# Patient Record
Sex: MALE | Race: AMERICAN INDIAN OR ALASKA NATIVE
[De-identification: names, ages, dates, MRNs, and addresses within clinical notes are randomized per-mention and may not be internally consistent; named-entity substitution may affect disease eponyms.]

---

## 2020-03-23 ENCOUNTER — HOSPITAL ENCOUNTER (EMERGENCY)
Dept: HOSPITAL 5 - ED | Age: 20
Discharge: HOME | End: 2020-03-23
Payer: COMMERCIAL

## 2020-03-23 VITALS — DIASTOLIC BLOOD PRESSURE: 64 MMHG | SYSTOLIC BLOOD PRESSURE: 124 MMHG

## 2020-03-23 DIAGNOSIS — J06.9: ICD-10-CM

## 2020-03-23 DIAGNOSIS — J30.9: Primary | ICD-10-CM

## 2020-03-23 PROCEDURE — 71046 X-RAY EXAM CHEST 2 VIEWS: CPT

## 2020-03-23 PROCEDURE — 99283 EMERGENCY DEPT VISIT LOW MDM: CPT

## 2020-03-23 NOTE — EMERGENCY DEPARTMENT REPORT
ED General Adult HPI





- General


Chief complaint: Upper Respiratory Infection


Stated complaint: CHEST PAIN, BOZENA


Time Seen by Provider: 03/23/20 19:30


Source: patient


Mode of arrival: Ambulatory


Limitations: No Limitations





- History of Present Illness


Initial comments: 


Mr. Joya is a 20-year-old -American male with no medical history, who 

presents for cough with chest congestion and shortness of breath x2 days.  

States cough is productive clear, there is sore throat, and body aches.  

Shortness of breath worse at night at hour sleep.  There is been no wheezing, 

fevers, chills, nausea and vomiting.  There is no suspicious travel, no 

suspicious gathering.  Patient denies modifying factors.








- Related Data


                                  Previous Rx's











 Medication  Instructions  Recorded  Last Taken  Type


 


Albuterol INH(or & Nicu Only) 2 puff IH QID PRN #8.5 gram 03/23/20 Unknown Rx





[ProAir HFA Inhaler]    


 


Benzonatate [Tessalon Perles] 100 mg PO Q8HR PRN #30 capsule 03/23/20 Unknown Rx


 


Ibuprofen [Motrin 800 MG tab] 800 mg PO Q8HR PRN #30 tablet 03/23/20 Unknown Rx


 


predniSONE [Deltasone] 40 mg PO QDAY 5 Days #10 tab 03/23/20 Unknown Rx











                                    Allergies











Allergy/AdvReac Type Severity Reaction Status Date / Time


 


Penicillins Allergy  Hives Verified 03/23/20 14:51














ED Review of Systems


ROS: 


Stated complaint: CHEST PAIN, BOZENA


Other details as noted in HPI








ED Past Medical Hx





- Past Medical History


Previous Medical History?: No





- Surgical History


Past Surgical History?: No





- Social History


Smoking Status: Never Smoker


Substance Use Type: None





- Medications


Home Medications: 


                                Home Medications











 Medication  Instructions  Recorded  Confirmed  Last Taken  Type


 


Albuterol INH(or & Nicu Only) 2 puff IH QID PRN #8.5 gram 03/23/20  Unknown Rx





[ProAir HFA Inhaler]     


 


Benzonatate [Tessalon Perles] 100 mg PO Q8HR PRN #30 capsule 03/23/20  Unknown 

Rx


 


Ibuprofen [Motrin 800 MG tab] 800 mg PO Q8HR PRN #30 tablet 03/23/20  Unknown Rx


 


predniSONE [Deltasone] 40 mg PO QDAY 5 Days #10 tab 03/23/20  Unknown Rx














ED Physical Exam





- General


Limitations: No Limitations





ED Course





                                   Vital Signs











  03/23/20





  15:03


 


Temperature 98 F


 


Pulse Rate 97 H


 


Respiratory 18





Rate 


 


Blood Pressure 152/59


 


O2 Sat by Pulse 98





Oximetry 











Critical care attestation.: 


If time is entered above; I have spent that time in minutes in the direct care 

of this critically ill patient, excluding procedure time.








ED Disposition


Clinical Impression: 


URI (upper respiratory infection)


Qualifiers:


 URI type: unspecified viral URI Qualified Code(s): J06.9 - Acute upper 

respiratory infection, unspecified





Allergic rhinitis


Qualifiers:


 Allergic rhinitis trigger: unspecified Allergic rhinitis seasonality: 

unspecified Qualified Code(s): J30.9 - Allergic rhinitis, unspecified





Disposition: DC-01 TO HOME OR SELFCARE


Is pt being admited?: No


Does the pt Need Aspirin: No


Condition: Stable


Instructions:  Upper Respiratory Infection (ED), Allergic Rhinitis (ED)


Prescriptions: 


predniSONE [Deltasone] 40 mg PO QDAY 5 Days #10 tab


Ibuprofen [Motrin 800 MG tab] 800 mg PO Q8HR PRN #30 tablet


 PRN Reason: pain


Albuterol INH(or & Nicu Only) [ProAir HFA Inhaler] 2 puff IH QID PRN #8.5 gram


 PRN Reason: Shortness Of Breath


Benzonatate [Tessalon Perles] 100 mg PO Q8HR PRN #30 capsule


 PRN Reason: Cough


Referrals: 


AMOL PATRICK MD [Staff Physician] - 3-5 Days


Forms:  Work/School Release Form(ED)


Time of Disposition: 20:02

## 2020-03-23 NOTE — EMERGENCY DEPARTMENT REPORT
ED General Adult HPI





- General


Chief complaint: Upper Respiratory Infection


Stated complaint: CHEST PAIN, BOZENA


Time Seen by Provider: 03/23/20 19:30


Source: patient


Mode of arrival: Ambulatory


Limitations: No Limitations





- History of Present Illness


Initial comments: 


Mr. Joya is a 20-year-old -American male with no medical history, who 

presents for cough with chest congestion and shortness of breath x2 days.  

States cough is productive clear, there is sore throat, and body aches.  

Shortness of breath worse at night at hour sleep.  There is been no wheezing, 

fevers, chills, nausea and vomiting.  There is no suspicious travel, no 

suspicious gathering.  Patient denies modifying factors.





Onset/Timing: 3


-: days(s)


Location: head, chest


Radiation: non-radiation


Severity scale (0 -10): 3


Quality: aching


Consistency: constant


Improves with: rest


Worsens with: other (environmental exposure )


Associated Symptoms: cough, shortness of breath.  denies: chest pain, 

diaphoresis, fever/chills, headaches, loss of appetite, nausea/vomiting, 

syncope, weakness


Treatments Prior to Arrival: none





- Related Data


                                  Previous Rx's











 Medication  Instructions  Recorded  Last Taken  Type


 


Albuterol INH(or & Nicu Only) 2 puff IH QID PRN #8.5 gram 03/23/20 Unknown Rx





[ProAir HFA Inhaler]    


 


Benzonatate [Tessalon Perles] 100 mg PO Q8HR PRN #30 capsule 03/23/20 Unknown Rx


 


Ibuprofen [Motrin 800 MG tab] 800 mg PO Q8HR PRN #30 tablet 03/23/20 Unknown Rx


 


predniSONE [Deltasone] 40 mg PO QDAY 5 Days #10 tab 03/23/20 Unknown Rx











                                    Allergies











Allergy/AdvReac Type Severity Reaction Status Date / Time


 


Penicillins Allergy  Hives Verified 03/23/20 14:51














ED Review of Systems


ROS: 


Stated complaint: CHEST PAIN, BOZENA


Other details as noted in HPI





Constitutional: denies: chills, fever, malaise


Eyes: denies: eye pain, eye discharge, vision change


ENT: congestion.  denies: ear pain, throat pain


Respiratory: cough, shortness of breath.  denies: wheezing


Cardiovascular: chest pain (pain with cough ).  denies: palpitations


Endocrine: no symptoms reported


Gastrointestinal: denies: abdominal pain, nausea, vomiting, diarrhea


Genitourinary: denies: urgency, dysuria


Musculoskeletal: denies: back pain, joint swelling, arthralgia


Skin: denies: rash, lesions


Neurological: denies: headache, weakness, paresthesias


Psychiatric: denies: anxiety, depression


Hematological/Lymphatic: denies: easy bleeding, easy bruising





ED Past Medical Hx





- Past Medical History


Previous Medical History?: No





- Surgical History


Past Surgical History?: No





- Social History


Smoking Status: Never Smoker


Substance Use Type: None





- Medications


Home Medications: 


                                Home Medications











 Medication  Instructions  Recorded  Confirmed  Last Taken  Type


 


Albuterol INH(or & Nicu Only) 2 puff IH QID PRN #8.5 gram 03/23/20  Unknown Rx





[ProAir HFA Inhaler]     


 


Benzonatate [Tessalon Perles] 100 mg PO Q8HR PRN #30 capsule 03/23/20  Unknown 

Rx


 


Ibuprofen [Motrin 800 MG tab] 800 mg PO Q8HR PRN #30 tablet 03/23/20  Unknown Rx


 


predniSONE [Deltasone] 40 mg PO QDAY 5 Days #10 tab 03/23/20  Unknown Rx














ED Physical Exam





- General


Limitations: No Limitations


General appearance: alert, in no apparent distress





- Head


Head exam: Present: atraumatic, normocephalic





- Eye


Eye exam: Present: normal appearance, PERRL, EOMI


Pupils: Present: normal accommodation





- ENT


ENT exam: Present: normal orophraynx, mucous membranes moist, TM's normal 

bilaterally, normal external ear exam





- Neck


Neck exam: Present: normal inspection, full ROM.  Absent: tenderness, 

lymphadenopathy, thyromegaly





- Respiratory


Respiratory exam: Present: normal lung sounds bilaterally, chest wall tenderness

 (left lateral chest tenderness to palpation, no crepitus no swelling, no 

deformity. ).  Absent: respiratory distress, wheezes, rales, rhonchi, stridor, 

prolonged expiratory





- Cardiovascular


Cardiovascular Exam: Present: regular rate, normal rhythm, normal heart sounds. 

 Absent: systolic murmur, diastolic murmur, rubs, gallop





- GI/Abdominal


GI/Abdominal exam: Present: soft, normal bowel sounds.  Absent: distended, 

tenderness, bruit, hernia





- Rectal


Rectal exam: Present: deferred





- Extremities Exam


Extremities exam: Present: normal inspection





- Back Exam


Back exam: Present: normal inspection, full ROM.  Absent: tenderness, CVA 

tenderness (R), CVA tenderness (L)





- Neurological Exam


Neurological exam: Present: alert, oriented X3, CN II-XII intact, normal gait





- Psychiatric


Psychiatric exam: Present: normal affect, normal mood





- Skin


Skin exam: Present: warm, dry, intact, normal color.  Absent: rash





ED Course





                                   Vital Signs











  03/23/20 03/23/20





  15:03 20:06


 


Temperature 98 F 


 


Pulse Rate 97 H 


 


Respiratory 18 16





Rate  


 


Blood Pressure 152/59 


 


O2 Sat by Pulse 98 





Oximetry  














ED Medical Decision Making





- Radiology Data


Radiology results: report reviewed, image reviewed


Findings


Reporting MD: Sonu Brown Dictation Time: March 23, 2020 14:54 

: Not available Transcription Date:


 


CHEST 2 VIEWS  


 


INDICATION / CLINICAL INFORMATION:  MAIN: c/p-sob; chest pain x 6 days; sob x 2 

days; no medical hx, no injury.  


 


COMPARISON:  None available.  


 


FINDINGS:  


 


SUPPORT DEVICES: None.  HEART / MEDIASTINUM: No significant abnormality.  LUNGS 

/ PLEURA: No significant pulmonary or pleural abnormality. No pneumothorax.  


 


ADDITIONAL FINDINGS: No significant additional findings.  


 


IMPRESSION:  1. No acute findings.  


 


Signer Name: Sonu Brown MD  Signed: 3/23/2020 2:54 PM  Workstation Name: 

[id number]








- Medical Decision Making


Chest x-ray normal no infiltrates no opacities. , ENT exam is normal there is no

 exudate no lesions no stridor no wheezing.  Lung sounds are clear bilaterally 

without left lateral chest wall pain is reproducible to deep palpation there is 

no crepitus swelling or ecchymosis.  Patient appears well and nontoxic with no 

acute distress at this time plan albuterol inhaler as needed shortness of breath

 ibuprofen as needed pain Tessalon Perles as needed for cough prednisone x3 days

 . patient will follow-up with primary care in 2 to 3 days. patient verbalizes 

agreement and understanding of discharge plan. he will be DC'd home in stable 

condition at this time.


Critical care attestation.: 


If time is entered above; I have spent that time in minutes in the direct care 

of this critically ill patient, excluding procedure time.








ED Disposition


Clinical Impression: 


 Bronchitis





URI (upper respiratory infection)


Qualifiers:


 URI type: unspecified viral URI Qualified Code(s): J06.9 - Acute upper 

respiratory infection, unspecified





Disposition: DC-01 TO HOME OR SELFCARE


Is pt being admited?: No


Does the pt Need Aspirin: No


Condition: Stable


Instructions:  Upper Respiratory Infection (ED), Allergic Rhinitis (ED), Chronic

 Bronchitis (ED)


Prescriptions: 


predniSONE [Deltasone] 40 mg PO QDAY 5 Days #10 tab


Ibuprofen [Motrin 800 MG tab] 800 mg PO Q8HR PRN #30 tablet


 PRN Reason: pain


Albuterol INH(or & Nicu Only) [ProAir HFA Inhaler] 2 puff IH QID PRN #8.5 gram


 PRN Reason: Shortness Of Breath


Benzonatate [Tessalon Perles] 100 mg PO Q8HR PRN #30 capsule


 PRN Reason: Cough


Referrals: 


AMOL PATRICK MD [Staff Physician] - 3-5 Days


Forms:  Work/School Release Form(ED)


Time of Disposition: 20:17

## 2020-03-23 NOTE — XRAY REPORT
CHEST 2 VIEWS 



INDICATION / CLINICAL INFORMATION:

MAIN: c/p-sob; chest pain x 6 days; sob x 2 days; no medical hx, no injury.



COMPARISON: 

None available.



FINDINGS:



SUPPORT DEVICES: None.

HEART / MEDIASTINUM: No significant abnormality. 

LUNGS / PLEURA: No significant pulmonary or pleural abnormality. No pneumothorax. 



ADDITIONAL FINDINGS: No significant additional findings.



IMPRESSION:

1. No acute findings.



Signer Name: Sonu Brown MD 

Signed: 3/23/2020 3:54 PM

Workstation Name: [id number]